# Patient Record
(demographics unavailable — no encounter records)

---

## 2024-10-09 NOTE — HISTORY OF PRESENT ILLNESS
[FreeTextEntry1] : spot check [de-identified] : 60 year old nevus presents for spot checks on body.

## 2024-10-09 NOTE — PHYSICAL EXAM
[FreeTextEntry3] : 0.5 cm verrucous papule right thigh  scattered white macules and brown papules on shins

## 2024-10-09 NOTE — ASSESSMENT
[FreeTextEntry1] : #seborrheic keratosis, inflamed --right thigh --biopsy as below  Procedure Note: Biopsy by shave  The risks/benefits/alternatives of skin biopsy were explained to the patient, which include and are not limited to bleeding, infection, scarring or discoloration of skin, and recurrence of lesion. Patient expressed understanding of these risks and provided consent to the procedure. Time out with verification of patient and lesion site was performed. Site was prepped with rubbing alcohol, lidocaine with epinephrine was injected for anesthesia, and biopsy was performed. Specimen sent to path. Procedure was without complication and well tolerated. Wound care was discussed.  #nevi --reassured benign, on shins --discussed photoprotection  #idiopathic guttate hypomelanosis --chronic benign